# Patient Record
Sex: FEMALE | Race: AMERICAN INDIAN OR ALASKA NATIVE
[De-identification: names, ages, dates, MRNs, and addresses within clinical notes are randomized per-mention and may not be internally consistent; named-entity substitution may affect disease eponyms.]

---

## 2018-03-26 ENCOUNTER — HOSPITAL ENCOUNTER (EMERGENCY)
Dept: HOSPITAL 5 - ED | Age: 43
Discharge: HOME | End: 2018-03-26
Payer: COMMERCIAL

## 2018-03-26 VITALS — DIASTOLIC BLOOD PRESSURE: 90 MMHG | SYSTOLIC BLOOD PRESSURE: 140 MMHG

## 2018-03-26 DIAGNOSIS — E87.6: Primary | ICD-10-CM

## 2018-03-26 DIAGNOSIS — Z88.5: ICD-10-CM

## 2018-03-26 DIAGNOSIS — J45.909: ICD-10-CM

## 2018-03-26 DIAGNOSIS — Z88.0: ICD-10-CM

## 2018-03-26 DIAGNOSIS — Z90.49: ICD-10-CM

## 2018-03-26 DIAGNOSIS — I10: ICD-10-CM

## 2018-03-26 DIAGNOSIS — M54.5: ICD-10-CM

## 2018-03-26 LAB
BACTERIA #/AREA URNS HPF: (no result) /HPF
BASOPHILS # (AUTO): 0 K/MM3 (ref 0–0.1)
BASOPHILS NFR BLD AUTO: 0.5 % (ref 0–1.8)
BILIRUB UR QL STRIP: (no result)
BLOOD UR QL VISUAL: (no result)
BUN SERPL-MCNC: 10 MG/DL (ref 7–17)
BUN/CREAT SERPL: 17 %
CALCIUM SERPL-MCNC: 8.5 MG/DL (ref 8.4–10.2)
EOSINOPHIL # BLD AUTO: 0.2 K/MM3 (ref 0–0.4)
EOSINOPHIL NFR BLD AUTO: 1.7 % (ref 0–4.3)
HCT VFR BLD CALC: 35.8 % (ref 30.3–42.9)
HEMOLYSIS INDEX: 1
HGB BLD-MCNC: 11.4 GM/DL (ref 10.1–14.3)
LYMPHOCYTES # BLD AUTO: 2.1 K/MM3 (ref 1.2–5.4)
LYMPHOCYTES NFR BLD AUTO: 20.6 % (ref 13.4–35)
MCH RBC QN AUTO: 26 PG (ref 28–32)
MCHC RBC AUTO-ENTMCNC: 32 % (ref 30–34)
MCV RBC AUTO: 80 FL (ref 79–97)
MONOCYTES # (AUTO): 0.7 K/MM3 (ref 0–0.8)
MONOCYTES % (AUTO): 6.8 % (ref 0–7.3)
MUCOUS THREADS #/AREA URNS HPF: (no result) /HPF
PH UR STRIP: 6 [PH] (ref 5–7)
PLATELET # BLD: 267 K/MM3 (ref 140–440)
PROT UR STRIP-MCNC: (no result) MG/DL
RBC # BLD AUTO: 4.48 M/MM3 (ref 3.65–5.03)
RBC #/AREA URNS HPF: 1 /HPF (ref 0–6)
UROBILINOGEN UR-MCNC: < 2 MG/DL (ref ?–2)
WBC #/AREA URNS HPF: 3 /HPF (ref 0–6)

## 2018-03-26 PROCEDURE — 80048 BASIC METABOLIC PNL TOTAL CA: CPT

## 2018-03-26 PROCEDURE — 81001 URINALYSIS AUTO W/SCOPE: CPT

## 2018-03-26 PROCEDURE — 74176 CT ABD & PELVIS W/O CONTRAST: CPT

## 2018-03-26 PROCEDURE — 36415 COLL VENOUS BLD VENIPUNCTURE: CPT

## 2018-03-26 PROCEDURE — 81025 URINE PREGNANCY TEST: CPT

## 2018-03-26 PROCEDURE — 85379 FIBRIN DEGRADATION QUANT: CPT

## 2018-03-26 PROCEDURE — 71046 X-RAY EXAM CHEST 2 VIEWS: CPT

## 2018-03-26 PROCEDURE — 85025 COMPLETE CBC W/AUTO DIFF WBC: CPT

## 2018-03-26 PROCEDURE — 99284 EMERGENCY DEPT VISIT MOD MDM: CPT

## 2018-03-26 NOTE — CAT SCAN REPORT
CT ABDOMEN PELVIS WITHOUT CONTRAST:



HISTORY:  Right flank pain.



COMPARISON: none.



TECHNIQUE:  Helical CT in 1.25mm intervals without IV contrast. 

Sagittal and coronal reconstructions. 





FINDINGS:



Lung bases: Trace right pleural effusion is identified measuring less 

than 1 cm in thickness. The visualized lung bases are well-aerated. 

Heart size is normal.



Liver: Normal.



Biliary system: Cholecystectomy changes. No biliary dilatation.



Pancreas: Normal.



Spleen: Normal.



Kidneys/ureters/bladder: Normal.



Adrenal glands: Normal.



Aorta: Normal.



Intestines: Normal.



Appendix: Not confidently identified, correlate with surgical history. 

No inflammatory changes in the right lower quadrant.



Pelvic viscera: 2 simple appearing right ovarian cysts are suspected 

measuring 2.9 cm and 3.5 cm. The left adnexa is unremarkable. The 

uterus is slightly lobular probably representing mild uterine fibroid 

disease.



Ascites: Trace pelvic ascites appears physiologic.



Adenopathy: None.



Musculoskeletal: Normal.





IMPRESSION:

Right ovarian cysts as described.

Probable mild uterine fibroid disease.

Trace right pleural effusion of uncertain significance, pleurisy?

Cholecystectomy.

The appendix is not confidently identified although no inflammatory 

changes are identified in the right lower quadrant. Correlate with 

surgical history.

## 2018-03-26 NOTE — XRAY REPORT
ROUTINE CHEST, TWO VIEWS:



HISTORY:  Right pleural effusion.



The trachea, heart, mediastinal contour, lung fields and bony thorax 

are unremarkable. The trace right pleural effusion seen on CT is not 

appreciated on x-ray.



IMPRESSION:

Unremarkable chest x-ray.

## 2018-03-26 NOTE — EMERGENCY DEPARTMENT REPORT
ED Back Pain/Injury HPI





- General


Chief Complaint: Back Pain/Injury


Stated Complaint: BACK PAIN


Time Seen by Provider: 03/26/18 07:12


Source: patient


Limitations: No Limitations





- History of Present Illness


Initial Comments: 


43-year-old female past medical history hypertension and asthma history of 

pituitary tumor cholecystectomy appendectomy presents with complaint of right 

flank pain worsening for 2 days.  Patient denies fevers chills nausea vomiting 

dysuria or hematuria or increased urinary frequency.  Patient states that pain 

started while she was doing laundry today's ago and has persisted and worsened.

  Patient is awake alert and oriented 3.  Accompanied by significant other at 

bedside.  Denies any rash or direct trauma or recent falls that may have hurt 

her back.


MD Complaint: back pain


Similar Symptoms Previously: Yes


Place: home


Radiation: none


Severity: moderate


Severity scale (0 -10): 6


Quality: aching


Consistency: intermittent


Improves With: immobilization, supine


Worsens With: movement


Context: while lifting, turning/twisting, bending


Associated Symptoms: denies other symptoms


Treatments Prior to Arrival: other (tramadol)





- Related Data


 Previous Rx's











 Medication  Instructions  Recorded  Last Taken  Type


 


Acetaminophen [Acetaminophen TAB] 500 mg PO Q6HR PRN #20 tablet 03/26/18 

Unknown Rx


 


Potassium Chloride [K-Dur] 20 meq PO QDAY #7 tablet 03/26/18 Unknown Rx


 


traMADol [Ultram 50 MG tab] 50 mg PO Q6HR PRN #8 tablet 03/26/18 Unknown Rx











 Allergies











Allergy/AdvReac Type Severity Reaction Status Date / Time


 


benazepril Allergy  Unknown Verified 03/26/18 03:02


 


codeine Allergy  Dizziness Verified 03/26/18 03:01


 


hydrocodone Allergy  Shortness Verified 03/26/18 03:02





   of Breath  


 


Penicillins Allergy  Shortness Verified 03/26/18 03:01





   of Breath  














ED Review of Systems


ROS: 


Stated complaint: BACK PAIN


Other details as noted in HPI





Constitutional: denies: chills, fever


Eyes: denies: eye pain, eye discharge, vision change


ENT: denies: ear pain, throat pain


Respiratory: denies: cough, shortness of breath, wheezing


Cardiovascular: denies: chest pain, palpitations


Endocrine: no symptoms reported


Gastrointestinal: denies: abdominal pain, nausea, diarrhea


Genitourinary: denies: urgency, dysuria, discharge


Musculoskeletal: back pain.  denies: joint swelling, arthralgia


Skin: denies: rash, lesions


Neurological: denies: headache, weakness, paresthesias


Psychiatric: denies: anxiety, depression


Hematological/Lymphatic: denies: easy bleeding, easy bruising





ED Past Medical Hx





- Past Medical History


Previous Medical History?: Yes


Hx Hypertension: Yes


Hx Asthma: Yes


Additional medical history: Pituitary tumor





- Surgical History


Past Surgical History?: Yes


Hx Appendectomy: Yes


Additional Surgical History: gall bladder removal, tonsils, right breast surgery

,





- Social History


Smoking Status: Never Smoker


Substance Use Type: None





- Medications


Home Medications: 


 Home Medications











 Medication  Instructions  Recorded  Confirmed  Last Taken  Type


 


Acetaminophen [Acetaminophen TAB] 500 mg PO Q6HR PRN #20 tablet 03/26/18  

Unknown Rx


 


Potassium Chloride [K-Dur] 20 meq PO QDAY #7 tablet 03/26/18  Unknown Rx


 


traMADol [Ultram 50 MG tab] 50 mg PO Q6HR PRN #8 tablet 03/26/18  Unknown Rx














ED Physical Exam





- General


Limitations: No Limitations


General appearance: alert, in no apparent distress





- Head


Head exam: Present: atraumatic, normocephalic





- Eye


Eye exam: Present: normal appearance, PERRL, EOMI





- ENT


ENT exam: Present: mucous membranes moist





- Neck


Neck exam: Present: normal inspection





- Respiratory


Respiratory exam: Present: normal lung sounds bilaterally.  Absent: respiratory 

distress





- Cardiovascular


Cardiovascular Exam: Present: regular rate, normal rhythm.  Absent: systolic 

murmur, diastolic murmur, rubs, gallop





- GI/Abdominal


GI/Abdominal exam: Present: soft (abdomen soft nontender nondistended), normal 

bowel sounds





- Extremities Exam


Extremities exam: Present: normal inspection





- Back Exam


Back exam: Present: normal inspection, full ROM, paraspinal tenderness (some 

paraspinal tenderness near the right latissimus region.  No midline cervical 

thoracic or lumbar spinal tenderness)





- Neurological Exam


Neurological exam: Present: alert, oriented X3, CN II-XII intact, normal gait





- Psychiatric


Psychiatric exam: Present: normal affect, normal mood





- Skin


Skin exam: Present: warm, dry, intact, normal color.  Absent: rash





ED Course


 Vital Signs











  03/26/18 03/26/18 03/26/18





  02:11 02:56 09:11


 


Temperature 98.2 F 98.2 F 


 


Pulse Rate 107 H 99 H 82


 


Respiratory 18 17 18





Rate   


 


Blood Pressure 132/101 132/101 


 


Blood Pressure   140/90





[Right]   


 


O2 Sat by Pulse 99 99 97





Oximetry   














ED Medical Decision Making





- Lab Data


Result diagrams: 


 03/26/18 08:33





 03/26/18 08:33





- Medical Decision Making


A/P: Musculoskeletal back pain, hypokalemia


1- CT abdomen and pelvis shows uterine fibroids shows no renal stones, trace 

pleural effusion right side lung.  Chest x-ray unremarkable.  D-dimer is 

negative.  Urinalysis unremarkable and CBC unremarkable


2- K-dur supplementation.  Potassium of 3.0 BNP


3- short course of Tylenol, tramadol when necessary


4- follow-up with primary care doctor


5- vs stable before dc


Critical care attestation.: 


If time is entered above; I have spent that time in minutes in the direct care 

of this critically ill patient, excluding procedure time.








ED Disposition


Clinical Impression: 


 Hypokalemia





Back pain


Qualifiers:


 Back pain location: low back pain Chronicity: acute Back pain laterality: 

right Sciatica presence: without sciatica Qualified Code(s): M54.5 - Low back 

pain





Disposition: DC-01 TO HOME OR SELFCARE


Is pt being admited?: No


Does the pt Need Aspirin: No


Condition: Stable


Instructions:  Hypokalemia (ED), Low Back Strain (ED), Acute Low Back Pain (ED)

, Back Pain (ED), Pleural Effusion (ED)


Prescriptions: 


Acetaminophen [Acetaminophen TAB] 500 mg PO Q6HR PRN #20 tablet


 PRN Reason: Pain


Potassium Chloride [K-Dur] 20 meq PO QDAY #7 tablet


traMADol [Ultram 50 MG tab] 50 mg PO Q6HR PRN #8 tablet


 PRN Reason: Pain


Referrals: 


[facility] [Outside] - 3-5 Days


JAQUAN ZEPEDA MD [Primary Care Provider] - 3-5 Days


REYES-BELTRAN,ANTONIO, MD [Staff Physician] - 3-5 Days


Forms:  Accompanied Note, Work/School Release Form(ED)


Time of Disposition: 10:13